# Patient Record
Sex: MALE | Race: WHITE | Employment: FULL TIME | URBAN - METROPOLITAN AREA
[De-identification: names, ages, dates, MRNs, and addresses within clinical notes are randomized per-mention and may not be internally consistent; named-entity substitution may affect disease eponyms.]

---

## 2017-04-21 RX ORDER — PAROXETINE HYDROCHLORIDE 40 MG/1
40 TABLET, FILM COATED ORAL EVERY MORNING
Qty: 30 TABLET | Refills: 0 | Status: SHIPPED | OUTPATIENT
Start: 2017-04-21 | End: 2017-05-02 | Stop reason: SDUPTHER

## 2017-05-02 ENCOUNTER — OFFICE VISIT (OUTPATIENT)
Dept: INTERNAL MEDICINE | Age: 27
End: 2017-05-02

## 2017-05-02 VITALS
BODY MASS INDEX: 20.68 KG/M2 | HEART RATE: 70 BPM | DIASTOLIC BLOOD PRESSURE: 72 MMHG | SYSTOLIC BLOOD PRESSURE: 90 MMHG | WEIGHT: 139.6 LBS | HEIGHT: 69 IN

## 2017-05-02 DIAGNOSIS — Z72.0 TOBACCO USE: ICD-10-CM

## 2017-05-02 DIAGNOSIS — F41.1 GAD (GENERALIZED ANXIETY DISORDER): Primary | ICD-10-CM

## 2017-05-02 PROCEDURE — 99213 OFFICE O/P EST LOW 20 MIN: CPT | Performed by: FAMILY MEDICINE

## 2017-05-02 RX ORDER — PAROXETINE HYDROCHLORIDE 40 MG/1
40 TABLET, FILM COATED ORAL EVERY MORNING
Qty: 30 TABLET | Refills: 11 | Status: SHIPPED | OUTPATIENT
Start: 2017-05-02 | End: 2018-07-02

## 2017-05-02 ASSESSMENT — ANXIETY QUESTIONNAIRES
3. WORRYING TOO MUCH ABOUT DIFFERENT THINGS: 1-SEVERAL DAYS
6. BECOMING EASILY ANNOYED OR IRRITABLE: 0-NOT AT ALL SURE
2. NOT BEING ABLE TO STOP OR CONTROL WORRYING: 0-NOT AT ALL SURE
4. TROUBLE RELAXING: 0-NOT AT ALL SURE
5. BEING SO RESTLESS THAT IT IS HARD TO SIT STILL: 0-NOT AT ALL SURE
GAD7 TOTAL SCORE: 1
7. FEELING AFRAID AS IF SOMETHING AWFUL MIGHT HAPPEN: 0-NOT AT ALL SURE
1. FEELING NERVOUS, ANXIOUS, OR ON EDGE: 0-NOT AT ALL SURE

## 2017-05-02 ASSESSMENT — PATIENT HEALTH QUESTIONNAIRE - PHQ9
SUM OF ALL RESPONSES TO PHQ QUESTIONS 1-9: 0
2. FEELING DOWN, DEPRESSED OR HOPELESS: 0
SUM OF ALL RESPONSES TO PHQ9 QUESTIONS 1 & 2: 0
1. LITTLE INTEREST OR PLEASURE IN DOING THINGS: 0

## 2017-05-03 RX ORDER — PAROXETINE HYDROCHLORIDE 20 MG/1
40 TABLET, FILM COATED ORAL DAILY
Qty: 180 TABLET | Refills: 4 | Status: SHIPPED | OUTPATIENT
Start: 2017-05-03 | End: 2018-05-13 | Stop reason: SDUPTHER

## 2017-12-19 ENCOUNTER — OFFICE VISIT (OUTPATIENT)
Dept: FAMILY MEDICINE CLINIC | Age: 27
End: 2017-12-19

## 2017-12-19 VITALS
HEIGHT: 67 IN | TEMPERATURE: 98 F | OXYGEN SATURATION: 99 % | RESPIRATION RATE: 16 BRPM | SYSTOLIC BLOOD PRESSURE: 128 MMHG | DIASTOLIC BLOOD PRESSURE: 70 MMHG | HEART RATE: 68 BPM | BODY MASS INDEX: 21.16 KG/M2 | WEIGHT: 134.8 LBS

## 2017-12-19 DIAGNOSIS — J01.10 ACUTE FRONTAL SINUSITIS, RECURRENCE NOT SPECIFIED: Primary | ICD-10-CM

## 2017-12-19 PROCEDURE — 99213 OFFICE O/P EST LOW 20 MIN: CPT | Performed by: NURSE PRACTITIONER

## 2017-12-19 PROCEDURE — G8427 DOCREV CUR MEDS BY ELIG CLIN: HCPCS | Performed by: NURSE PRACTITIONER

## 2017-12-19 PROCEDURE — G8484 FLU IMMUNIZE NO ADMIN: HCPCS | Performed by: NURSE PRACTITIONER

## 2017-12-19 PROCEDURE — G8420 CALC BMI NORM PARAMETERS: HCPCS | Performed by: NURSE PRACTITIONER

## 2017-12-19 PROCEDURE — 4004F PT TOBACCO SCREEN RCVD TLK: CPT | Performed by: NURSE PRACTITIONER

## 2017-12-19 RX ORDER — AMOXICILLIN AND CLAVULANATE POTASSIUM 875; 125 MG/1; MG/1
1 TABLET, FILM COATED ORAL 2 TIMES DAILY
Qty: 20 TABLET | Refills: 0 | Status: SHIPPED | OUTPATIENT
Start: 2017-12-19 | End: 2017-12-29

## 2017-12-19 ASSESSMENT — ENCOUNTER SYMPTOMS
VOMITING: 0
DIARRHEA: 0
TROUBLE SWALLOWING: 0
WHEEZING: 1
NAUSEA: 0
SHORTNESS OF BREATH: 0
ABDOMINAL PAIN: 0
SINUS PRESSURE: 1
HOARSE VOICE: 0
COUGH: 1
SWOLLEN GLANDS: 0
EYE DISCHARGE: 0
SORE THROAT: 0
EYE REDNESS: 0

## 2018-05-14 RX ORDER — PAROXETINE HYDROCHLORIDE 20 MG/1
TABLET, FILM COATED ORAL
Qty: 60 TABLET | Refills: 0 | Status: SHIPPED | OUTPATIENT
Start: 2018-05-14 | End: 2018-07-02 | Stop reason: SDUPTHER

## 2018-07-02 ENCOUNTER — OFFICE VISIT (OUTPATIENT)
Dept: INTERNAL MEDICINE | Age: 28
End: 2018-07-02
Payer: COMMERCIAL

## 2018-07-02 VITALS
BODY MASS INDEX: 22.66 KG/M2 | OXYGEN SATURATION: 98 % | WEIGHT: 144.4 LBS | SYSTOLIC BLOOD PRESSURE: 112 MMHG | HEIGHT: 67 IN | HEART RATE: 72 BPM | DIASTOLIC BLOOD PRESSURE: 82 MMHG

## 2018-07-02 DIAGNOSIS — Z23 NEED FOR PNEUMOCOCCAL VACCINATION: ICD-10-CM

## 2018-07-02 DIAGNOSIS — Z72.0 TOBACCO USE: ICD-10-CM

## 2018-07-02 DIAGNOSIS — F41.1 GAD (GENERALIZED ANXIETY DISORDER): Primary | ICD-10-CM

## 2018-07-02 PROCEDURE — 4004F PT TOBACCO SCREEN RCVD TLK: CPT | Performed by: FAMILY MEDICINE

## 2018-07-02 PROCEDURE — 90732 PPSV23 VACC 2 YRS+ SUBQ/IM: CPT | Performed by: FAMILY MEDICINE

## 2018-07-02 PROCEDURE — G8420 CALC BMI NORM PARAMETERS: HCPCS | Performed by: FAMILY MEDICINE

## 2018-07-02 PROCEDURE — 90471 IMMUNIZATION ADMIN: CPT | Performed by: FAMILY MEDICINE

## 2018-07-02 PROCEDURE — G8427 DOCREV CUR MEDS BY ELIG CLIN: HCPCS | Performed by: FAMILY MEDICINE

## 2018-07-02 PROCEDURE — 99214 OFFICE O/P EST MOD 30 MIN: CPT | Performed by: FAMILY MEDICINE

## 2018-07-02 RX ORDER — PAROXETINE HYDROCHLORIDE 20 MG/1
TABLET, FILM COATED ORAL
Qty: 180 TABLET | Refills: 3 | Status: SHIPPED | OUTPATIENT
Start: 2018-07-02 | End: 2019-08-23 | Stop reason: SDUPTHER

## 2018-07-02 ASSESSMENT — ENCOUNTER SYMPTOMS
SHORTNESS OF BREATH: 0
COUGH: 0
CHEST TIGHTNESS: 0
CHOKING: 0
APNEA: 0
STRIDOR: 0

## 2018-07-02 ASSESSMENT — PATIENT HEALTH QUESTIONNAIRE - PHQ9
2. FEELING DOWN, DEPRESSED OR HOPELESS: 0
1. LITTLE INTEREST OR PLEASURE IN DOING THINGS: 0
SUM OF ALL RESPONSES TO PHQ9 QUESTIONS 1 & 2: 0
SUM OF ALL RESPONSES TO PHQ QUESTIONS 1-9: 0

## 2018-09-19 ENCOUNTER — HOSPITAL ENCOUNTER (EMERGENCY)
Age: 28
Discharge: HOME | End: 2018-09-19
Attending: EMERGENCY MEDICINE
Payer: COMMERCIAL

## 2018-09-19 ENCOUNTER — APPOINTMENT (OUTPATIENT)
Dept: GENERAL RADIOLOGY | Age: 28
End: 2018-09-19
Payer: COMMERCIAL

## 2018-09-19 VITALS
BODY MASS INDEX: 22.5 KG/M2 | WEIGHT: 140 LBS | SYSTOLIC BLOOD PRESSURE: 126 MMHG | TEMPERATURE: 98.5 F | HEIGHT: 66 IN | HEART RATE: 78 BPM | OXYGEN SATURATION: 97 % | DIASTOLIC BLOOD PRESSURE: 58 MMHG | RESPIRATION RATE: 18 BRPM

## 2018-09-19 DIAGNOSIS — T14.8XXA PUNCTURE WOUND: ICD-10-CM

## 2018-09-19 DIAGNOSIS — S60.032A CONTUSION OF LEFT MIDDLE FINGER WITHOUT DAMAGE TO NAIL, INITIAL ENCOUNTER: Primary | ICD-10-CM

## 2018-09-19 PROCEDURE — 29130 APPL FINGER SPLINT STATIC: CPT

## 2018-09-19 PROCEDURE — 99282 EMERGENCY DEPT VISIT SF MDM: CPT

## 2018-09-19 PROCEDURE — 73140 X-RAY EXAM OF FINGER(S): CPT

## 2018-09-19 RX ORDER — CEPHALEXIN 500 MG/1
500 CAPSULE ORAL 4 TIMES DAILY
Qty: 28 CAPSULE | Refills: 0 | Status: SHIPPED | OUTPATIENT
Start: 2018-09-19 | End: 2018-09-26

## 2018-09-19 RX ORDER — GINSENG 100 MG
CAPSULE ORAL
Status: DISCONTINUED
Start: 2018-09-19 | End: 2018-09-19 | Stop reason: HOSPADM

## 2018-09-19 RX ORDER — HYDROCODONE BITARTRATE AND ACETAMINOPHEN 5; 325 MG/1; MG/1
1 TABLET ORAL EVERY 6 HOURS PRN
Qty: 10 TABLET | Refills: 0 | Status: SHIPPED | OUTPATIENT
Start: 2018-09-19 | End: 2018-09-26

## 2018-09-19 RX ORDER — IBUPROFEN 600 MG/1
600 TABLET ORAL EVERY 6 HOURS PRN
Qty: 30 TABLET | Refills: 0 | Status: SHIPPED | OUTPATIENT
Start: 2018-09-19

## 2018-09-19 ASSESSMENT — PAIN DESCRIPTION - PROGRESSION: CLINICAL_PROGRESSION: NOT CHANGED

## 2018-09-19 ASSESSMENT — PAIN DESCRIPTION - ONSET: ONSET: ON-GOING

## 2018-09-19 ASSESSMENT — PAIN DESCRIPTION - PAIN TYPE: TYPE: ACUTE PAIN

## 2018-09-19 ASSESSMENT — ENCOUNTER SYMPTOMS
SINUS PRESSURE: 0
BLOOD IN STOOL: 0
BACK PAIN: 0
TROUBLE SWALLOWING: 0
EYE REDNESS: 0
SORE THROAT: 0
CHEST TIGHTNESS: 0
CHOKING: 0
CONSTIPATION: 0
COUGH: 0
ABDOMINAL PAIN: 0
VOICE CHANGE: 0
WHEEZING: 0
FACIAL SWELLING: 0
SHORTNESS OF BREATH: 0
STRIDOR: 0
VOMITING: 0
DIARRHEA: 0
EYE PAIN: 0
EYE DISCHARGE: 0

## 2018-09-19 ASSESSMENT — PAIN DESCRIPTION - DESCRIPTORS: DESCRIPTORS: NUMBNESS

## 2018-09-19 ASSESSMENT — PAIN SCALES - GENERAL: PAINLEVEL_OUTOF10: 4

## 2018-09-19 ASSESSMENT — PAIN DESCRIPTION - ORIENTATION: ORIENTATION: MID

## 2018-09-19 ASSESSMENT — PAIN DESCRIPTION - FREQUENCY: FREQUENCY: CONTINUOUS

## 2018-09-19 ASSESSMENT — PAIN DESCRIPTION - LOCATION: LOCATION: FINGER (COMMENT WHICH ONE)

## 2018-09-19 NOTE — ED PROVIDER NOTES
[facility] ED  eMERGENCY dEPARTMENT eNCOUnter      Pt Name: Shahla Sanders  MRN: [id number]  [place] 1990  Date of evaluation: 9/19/2018  Provider: Yasir Lindsey MD    [address] [place] [address]       Chief Complaint   Patient presents with    Puncture Wound     Puncture wound to left 3rd finger from a drill bit 30 min. ago. HISTORY OF PRESENT ILLNESS   (Location/Symptom, Timing/Onset, Context/Setting, Quality, Duration, Modifying Factors, Severity)  Note limiting factors. Shahla Sanders is a 33 y.o. male who presents to the emergency department Patient a right-hand-dominant history of anxiety up to date on tetanus immunization as per patient accidentally he has a puncture wound to the left middle finger with some swelling bleeding patient was drilling and it went inside patient has no numbness tingling to the tip of the finger no other injuries     HPI    Nursing Notes were reviewed. REVIEW OF SYSTEMS    (2-9 systems for level 4, 10 or more for level 5)     Review of Systems   Constitutional: Negative. Negative for activity change and fever. HENT: Negative for congestion, drooling, facial swelling, mouth sores, nosebleeds, sinus pressure, sore throat, trouble swallowing and voice change. Eyes: Negative for pain, discharge, redness and visual disturbance. Respiratory: Negative for cough, choking, chest tightness, shortness of breath, wheezing and stridor. Cardiovascular: Negative for chest pain, palpitations and leg swelling. Gastrointestinal: Negative for abdominal pain, blood in stool, constipation, diarrhea and vomiting. Endocrine: Negative for cold intolerance, polyphagia and polyuria. Genitourinary: Negative for dysuria, flank pain, frequency, genital sores and urgency. Musculoskeletal: Positive for arthralgias and joint swelling. Negative for back pain, neck pain and neck stiffness. Skin: Positive for wound. Negative for pallor and rash.    Neurological: Negative for tremors, seizures, syncope, weakness, numbness and headaches. Hematological: Negative for adenopathy. Does not bruise/bleed easily. Psychiatric/Behavioral: Negative for agitation, behavioral problems, hallucinations and sleep disturbance. The patient is not hyperactive. All other systems reviewed and are negative. Except as noted above the remainder of the review of systems was reviewed and negative. PAST MEDICAL HISTORY     Past Medical History:   Diagnosis Date    Anxiety     Depression     Tobacco use 5/2/2017         SURGICAL HISTORY     History reviewed. No pertinent surgical history. CURRENT MEDICATIONS       Previous Medications    ALBUTEROL SULFATE  (90 BASE) MCG/ACT INHALER    Inhale 2 puffs into the lungs every 6 hours as needed for Wheezing    PAROXETINE (PAXIL) 20 MG TABLET    TAKE TWO TABLETS BY MOUTH ONCE DAILY       ALLERGIES     Patient has no known allergies.     FAMILY HISTORY       Family History   Problem Relation Age of Onset    Diabetes Maternal Grandmother     Cancer Maternal Grandfather           SOCIAL HISTORY       Social History     Social History    Marital status: Single     Spouse name: N/A    Number of children: N/A    Years of education: N/A     Occupational History           Social History Main Topics    Smoking status: Current Every Day Smoker     Packs/day: 1.00     Start date: 2008    Smokeless tobacco: Former User      Comment: Would like to quit smoking    Alcohol use No    Drug use: No    Sexual activity: Not Currently     Partners: Female     Other Topics Concern    None     Social History Narrative    None       SCREENINGS             PHYSICAL EXAM    (up to 7 for level 4, 8 or more for level 5)     ED Triage Vitals [09/19/18 1839]   BP Temp Temp src Pulse Resp SpO2 Height Weight   (!) 126/58 98.5 °F (36.9 °C) -- 78 18 97 % 5' 6\" (1.676 m) 140 lb (63.5 kg)       Physical Exam   Constitutional: He is oriented to person, place, and time. He appears well-developed and well-nourished. HENT:   Head: Normocephalic and atraumatic. Mouth/Throat: No oropharyngeal exudate. Eyes: EOM are normal. Right eye exhibits no discharge. Left eye exhibits no discharge. Neck: Neck supple. No JVD present. No tracheal deviation present. No thyromegaly present. Cardiovascular: Normal rate, regular rhythm and normal heart sounds. Exam reveals no gallop and no friction rub. No murmur heard. Pulmonary/Chest: Breath sounds normal. No respiratory distress. He has no wheezes. He has no rales. He exhibits no tenderness. Abdominal: Soft. Bowel sounds are normal. He exhibits no mass. There is no rebound. Musculoskeletal: Normal range of motion. He exhibits tenderness. He exhibits no edema or deformity. Attention given to the left middle finger patient has a puncture wound to the middle phalanx on the volar aspect patient no contamination seen patient able to flex and extend of her DIP joint   Lymphadenopathy:     He has no cervical adenopathy. Neurological: He is alert and oriented to person, place, and time. No cranial nerve deficit. He exhibits normal muscle tone. Skin: Skin is warm. No rash noted. No erythema.    Psychiatric: His behavior is normal. Thought content normal.       DIAGNOSTIC RESULTS     EKG: All EKG's are interpreted by the Emergency Department Physician who either signs or Co-signs this chart in the absence of a cardiologist.        RADIOLOGY:   Non-plain film images such as CT, Ultrasound and MRI are read by the radiologist. Plain radiographic images are visualized and preliminarily interpreted by the emergency physician with the below findings:        Interpretation per the Radiologist below, if available at the time of this note:    XR FINGER LEFT (MIN 2 VIEWS)    (Results Pending)         ED BEDSIDE ULTRASOUND:   Performed by ED Physician - none    LABS:  Labs Reviewed - No data to display    All other labs were within normal range or not returned as of this dictation. EMERGENCY DEPARTMENT COURSE and DIFFERENTIAL DIAGNOSIS/MDM:   Vitals:    Vitals:    09/19/18 1839   BP: (!) 126/58   Pulse: 78   Resp: 18   Temp: 98.5 °F (36.9 °C)   SpO2: 97%   Weight: 140 lb (63.5 kg)   Height: 5' 6\" (1.676 m)           MDM  Number of Diagnoses or Management Options  Contusion of left middle finger without damage to nail, initial encounter: established and improving  Puncture wound: established and improving  Diagnosis management comments: Wound cleaned and soaked and also was flushed there is some debris visible on the x-ray but nothing visible clinically       Amount and/or Complexity of Data Reviewed  Tests in the radiology section of CPT®: ordered and reviewed        CRITICAL CARE TIME   Total Critical Care time was  minutes, excluding separately reportable procedures. There was a high probability of clinically significant/life threatening deterioration in the patient's condition which required my urgent intervention. CONSULTS:  None    PROCEDURES:  Unless otherwise noted below, none     Procedures    FINAL IMPRESSION      1. Contusion of left middle finger without damage to nail, initial encounter    2. Puncture wound          DISPOSITION/PLAN   DISPOSITION Decision To Discharge 09/19/2018 07:37:21 PM      PATIENT REFERRED TO:  ADELSO Moreno  [address] [place] [address]  [phone]    In 1 week        DISCHARGE MEDICATIONS:  New Prescriptions    CEPHALEXIN (KEFLEX) 500 MG CAPSULE    Take 1 capsule by mouth 4 times daily for 7 days    HYDROCODONE-ACETAMINOPHEN (NORCO) 5-325 MG PER TABLET    Take 1 tablet by mouth every 6 hours as needed for Pain for up to 7 days. .    IBUPROFEN (ADVIL;MOTRIN) 600 MG TABLET    Take 1 tablet by mouth every 6 hours as needed for Pain          (Please note that portions of this note were completed with a voice recognition program.  Efforts were made to edit the dictations but

## 2019-08-28 ENCOUNTER — OFFICE VISIT (OUTPATIENT)
Dept: INTERNAL MEDICINE | Age: 29
End: 2019-08-28
Payer: COMMERCIAL

## 2019-08-28 VITALS
HEART RATE: 59 BPM | DIASTOLIC BLOOD PRESSURE: 60 MMHG | OXYGEN SATURATION: 99 % | SYSTOLIC BLOOD PRESSURE: 100 MMHG | BODY MASS INDEX: 22.05 KG/M2 | HEIGHT: 66 IN | WEIGHT: 137.2 LBS

## 2019-08-28 DIAGNOSIS — Z00.00 ANNUAL PHYSICAL EXAM: Primary | ICD-10-CM

## 2019-08-28 DIAGNOSIS — F41.1 GAD (GENERALIZED ANXIETY DISORDER): ICD-10-CM

## 2019-08-28 DIAGNOSIS — Z72.0 TOBACCO USE: ICD-10-CM

## 2019-08-28 PROCEDURE — 99395 PREV VISIT EST AGE 18-39: CPT | Performed by: FAMILY MEDICINE

## 2019-08-28 RX ORDER — PAROXETINE HYDROCHLORIDE 20 MG/1
TABLET, FILM COATED ORAL
Qty: 180 TABLET | Refills: 3 | Status: SHIPPED | OUTPATIENT
Start: 2019-08-28 | End: 2020-12-16 | Stop reason: SDUPTHER

## 2019-08-28 ASSESSMENT — ENCOUNTER SYMPTOMS
COUGH: 0
CHEST TIGHTNESS: 0
SHORTNESS OF BREATH: 0
CHOKING: 0
APNEA: 0
STRIDOR: 0

## 2019-08-28 ASSESSMENT — PATIENT HEALTH QUESTIONNAIRE - PHQ9
SUM OF ALL RESPONSES TO PHQ9 QUESTIONS 1 & 2: 2
2. FEELING DOWN, DEPRESSED OR HOPELESS: 1
SUM OF ALL RESPONSES TO PHQ QUESTIONS 1-9: 2
SUM OF ALL RESPONSES TO PHQ QUESTIONS 1-9: 2
1. LITTLE INTEREST OR PLEASURE IN DOING THINGS: 1

## 2019-08-28 NOTE — PROGRESS NOTES
Patient: Aminata Lopez    YOB: 1990      Patient Active Problem List    Diagnosis Date Noted    Tobacco use 05/02/2017    KINSEY (generalized anxiety disorder) 05/02/2017    Regular astigmatism 10/06/2014     Past Medical History:   Diagnosis Date    Anxiety     Depression     Tobacco use 5/2/2017     History reviewed. No pertinent surgical history.   Family History   Problem Relation Age of Onset    Diabetes Maternal Grandmother     Cancer Maternal Grandfather      Social History     Socioeconomic History    Marital status: Single     Spouse name: Not on file    Number of children: Not on file    Years of education: Not on file    Highest education level: Not on file   Occupational History    Occupation: [employer]    Social Needs    Financial resource strain: Not on file    Food insecurity:     Worry: Not on file     Inability: Not on file    Transportation needs:     Medical: Not on file     Non-medical: Not on file   Tobacco Use    Smoking status: Current Every Day Smoker     Packs/day: 1.00     Start date: 2008    Smokeless tobacco: Former User    Tobacco comment: Would like to quit smoking   Substance and Sexual Activity    Alcohol use: No     Alcohol/week: 0.0 standard drinks    Drug use: No    Sexual activity: Not Currently     Partners: Female   Lifestyle    Physical activity:     Days per week: Not on file     Minutes per session: Not on file    Stress: Not on file   Relationships    Social connections:     Talks on phone: Not on file     Gets together: Not on file     Attends Congregation service: Not on file     Active member of club or organization: Not on file     Attends meetings of clubs or organizations: Not on file     Relationship status: Not on file    Intimate partner violence:     Fear of current or ex partner: Not on file     Emotionally abused: Not on file     Physically abused: Not on file     Forced sexual activity: Not on file   Other Topics Concern    Not

## 2019-09-24 ENCOUNTER — OFFICE VISIT (OUTPATIENT)
Dept: INTERNAL MEDICINE | Age: 29
End: 2019-09-24
Payer: COMMERCIAL

## 2019-09-24 ENCOUNTER — NURSE TRIAGE (OUTPATIENT)
Dept: OTHER | Facility: CLINIC | Age: 29
End: 2019-09-24

## 2019-09-24 VITALS
HEART RATE: 66 BPM | OXYGEN SATURATION: 96 % | DIASTOLIC BLOOD PRESSURE: 74 MMHG | WEIGHT: 138.2 LBS | SYSTOLIC BLOOD PRESSURE: 122 MMHG | BODY MASS INDEX: 22.21 KG/M2 | HEIGHT: 66 IN | TEMPERATURE: 98.6 F

## 2019-09-24 DIAGNOSIS — R19.7 DIARRHEA, UNSPECIFIED TYPE: Primary | ICD-10-CM

## 2019-09-24 PROCEDURE — 99213 OFFICE O/P EST LOW 20 MIN: CPT | Performed by: PHYSICIAN ASSISTANT

## 2019-09-24 PROCEDURE — G8427 DOCREV CUR MEDS BY ELIG CLIN: HCPCS | Performed by: PHYSICIAN ASSISTANT

## 2019-09-24 PROCEDURE — 4004F PT TOBACCO SCREEN RCVD TLK: CPT | Performed by: PHYSICIAN ASSISTANT

## 2019-09-24 PROCEDURE — G8420 CALC BMI NORM PARAMETERS: HCPCS | Performed by: PHYSICIAN ASSISTANT

## 2019-09-24 ASSESSMENT — ENCOUNTER SYMPTOMS
BLOATING: 0
DIARRHEA: 1
VOMITING: 0
FLATUS: 0
ABDOMINAL PAIN: 0

## 2019-09-25 DIAGNOSIS — R19.7 DIARRHEA, UNSPECIFIED TYPE: ICD-10-CM

## 2019-09-26 LAB
C DIFF TOXIN/ANTIGEN: NORMAL
GI BACTERIAL PATHOGENS BY PCR: NORMAL

## 2019-09-30 DIAGNOSIS — R19.7 DIARRHEA, UNSPECIFIED TYPE: Primary | ICD-10-CM

## 2019-10-01 ASSESSMENT — ENCOUNTER SYMPTOMS
NAUSEA: 0
BLOOD IN STOOL: 0
RECTAL PAIN: 0
CONSTIPATION: 0
SHORTNESS OF BREATH: 0
COUGH: 0

## 2019-10-15 ENCOUNTER — OFFICE VISIT (OUTPATIENT)
Dept: GASTROENTEROLOGY | Age: 29
End: 2019-10-15
Payer: COMMERCIAL

## 2019-10-15 VITALS
HEART RATE: 98 BPM | OXYGEN SATURATION: 98 % | BODY MASS INDEX: 20.89 KG/M2 | HEIGHT: 66 IN | DIASTOLIC BLOOD PRESSURE: 80 MMHG | SYSTOLIC BLOOD PRESSURE: 136 MMHG | WEIGHT: 130 LBS

## 2019-10-15 DIAGNOSIS — R19.7 DIARRHEA, UNSPECIFIED TYPE: ICD-10-CM

## 2019-10-15 DIAGNOSIS — R19.7 DIARRHEA, UNSPECIFIED TYPE: Primary | ICD-10-CM

## 2019-10-15 LAB
ALBUMIN SERPL-MCNC: 5 G/DL (ref 3.5–4.6)
ALP BLD-CCNC: 61 U/L (ref 35–104)
ALT SERPL-CCNC: 22 U/L (ref 0–41)
ANION GAP SERPL CALCULATED.3IONS-SCNC: 12 MEQ/L (ref 9–15)
AST SERPL-CCNC: 24 U/L (ref 0–40)
BILIRUB SERPL-MCNC: 0.3 MG/DL (ref 0.2–0.7)
BUN BLDV-MCNC: 8 MG/DL (ref 6–20)
CALCIUM SERPL-MCNC: 9.4 MG/DL (ref 8.5–9.9)
CHLORIDE BLD-SCNC: 102 MEQ/L (ref 95–107)
CO2: 28 MEQ/L (ref 20–31)
CREAT SERPL-MCNC: 0.89 MG/DL (ref 0.7–1.2)
GFR AFRICAN AMERICAN: >60
GFR NON-AFRICAN AMERICAN: >60
GLOBULIN: 1.9 G/DL (ref 2.3–3.5)
GLUCOSE BLD-MCNC: 75 MG/DL (ref 70–99)
HCT VFR BLD CALC: 44.9 % (ref 42–52)
HEMOGLOBIN: 15.5 G/DL (ref 14–18)
MCH RBC QN AUTO: 31.6 PG (ref 27–31.3)
MCHC RBC AUTO-ENTMCNC: 34.5 % (ref 33–37)
MCV RBC AUTO: 91.7 FL (ref 80–100)
PDW BLD-RTO: 14 % (ref 11.5–14.5)
PLATELET # BLD: 158 K/UL (ref 130–400)
POTASSIUM SERPL-SCNC: 4 MEQ/L (ref 3.4–4.9)
RBC # BLD: 4.9 M/UL (ref 4.7–6.1)
SODIUM BLD-SCNC: 142 MEQ/L (ref 135–144)
TOTAL PROTEIN: 6.9 G/DL (ref 6.3–8)
WBC # BLD: 7.2 K/UL (ref 4.8–10.8)

## 2019-10-15 PROCEDURE — 99204 OFFICE O/P NEW MOD 45 MIN: CPT | Performed by: INTERNAL MEDICINE

## 2019-10-15 RX ORDER — POLYETHYLENE GLYCOL 3350, SODIUM CHLORIDE, SODIUM BICARBONATE, POTASSIUM CHLORIDE 420; 11.2; 5.72; 1.48 G/4L; G/4L; G/4L; G/4L
4000 POWDER, FOR SOLUTION ORAL ONCE
Qty: 1 BOTTLE | Refills: 0 | Status: SHIPPED | OUTPATIENT
Start: 2019-10-15 | End: 2019-10-15

## 2019-10-15 ASSESSMENT — ENCOUNTER SYMPTOMS
NAUSEA: 0
BLOOD IN STOOL: 0
CHEST TIGHTNESS: 0
ABDOMINAL PAIN: 1
SHORTNESS OF BREATH: 0
EYE REDNESS: 0
EYE PAIN: 0
PHOTOPHOBIA: 0
TROUBLE SWALLOWING: 0
ABDOMINAL DISTENTION: 0
RECTAL PAIN: 0
COLOR CHANGE: 0
CONSTIPATION: 0
VOICE CHANGE: 0
WHEEZING: 0
DIARRHEA: 1
VOMITING: 0

## 2019-10-18 LAB — CELIAC PANEL: 5 UNITS (ref 0–19)

## 2019-10-31 ENCOUNTER — OFFICE VISIT (OUTPATIENT)
Dept: INTERNAL MEDICINE | Age: 29
End: 2019-10-31
Payer: COMMERCIAL

## 2019-10-31 VITALS
OXYGEN SATURATION: 98 % | BODY MASS INDEX: 21.6 KG/M2 | TEMPERATURE: 98.7 F | SYSTOLIC BLOOD PRESSURE: 110 MMHG | WEIGHT: 134.4 LBS | HEART RATE: 67 BPM | DIASTOLIC BLOOD PRESSURE: 70 MMHG | HEIGHT: 66 IN

## 2019-10-31 DIAGNOSIS — J06.9 ACUTE URI: Primary | ICD-10-CM

## 2019-10-31 DIAGNOSIS — R53.83 FATIGUE, UNSPECIFIED TYPE: ICD-10-CM

## 2019-10-31 LAB
INFLUENZA A ANTIBODY: NORMAL
INFLUENZA B ANTIBODY: NORMAL

## 2019-10-31 PROCEDURE — 99213 OFFICE O/P EST LOW 20 MIN: CPT | Performed by: PHYSICIAN ASSISTANT

## 2019-10-31 PROCEDURE — 87804 INFLUENZA ASSAY W/OPTIC: CPT | Performed by: PHYSICIAN ASSISTANT

## 2019-10-31 PROCEDURE — 4004F PT TOBACCO SCREEN RCVD TLK: CPT | Performed by: PHYSICIAN ASSISTANT

## 2019-10-31 PROCEDURE — G8420 CALC BMI NORM PARAMETERS: HCPCS | Performed by: PHYSICIAN ASSISTANT

## 2019-10-31 PROCEDURE — G8427 DOCREV CUR MEDS BY ELIG CLIN: HCPCS | Performed by: PHYSICIAN ASSISTANT

## 2019-10-31 PROCEDURE — G8484 FLU IMMUNIZE NO ADMIN: HCPCS | Performed by: PHYSICIAN ASSISTANT

## 2019-10-31 RX ORDER — AZITHROMYCIN 250 MG/1
250 TABLET, FILM COATED ORAL SEE ADMIN INSTRUCTIONS
Qty: 6 TABLET | Refills: 0 | Status: SHIPPED | OUTPATIENT
Start: 2019-10-31 | End: 2019-11-05

## 2019-10-31 ASSESSMENT — ENCOUNTER SYMPTOMS
SORE THROAT: 0
SWOLLEN GLANDS: 0
WHEEZING: 0
VOMITING: 0
NAUSEA: 0
RHINORRHEA: 1
COUGH: 1
SHORTNESS OF BREATH: 0

## 2019-11-13 ENCOUNTER — TELEPHONE (OUTPATIENT)
Dept: GASTROENTEROLOGY | Age: 29
End: 2019-11-13

## 2019-11-14 RX ORDER — POLYETHYLENE GLYCOL 3350, SODIUM CHLORIDE, SODIUM BICARBONATE, POTASSIUM CHLORIDE 420; 11.2; 5.72; 1.48 G/4L; G/4L; G/4L; G/4L
4000 POWDER, FOR SOLUTION ORAL ONCE
Qty: 1 BOTTLE | Refills: 0 | Status: SHIPPED | OUTPATIENT
Start: 2019-11-14 | End: 2019-11-14

## 2020-12-16 ENCOUNTER — VIRTUAL VISIT (OUTPATIENT)
Dept: INTERNAL MEDICINE | Age: 30
End: 2020-12-16

## 2020-12-16 PROCEDURE — 99213 OFFICE O/P EST LOW 20 MIN: CPT | Performed by: FAMILY MEDICINE

## 2020-12-16 RX ORDER — PAROXETINE 30 MG/1
TABLET, FILM COATED ORAL
Qty: 90 TABLET | Refills: 3 | Status: SHIPPED | OUTPATIENT
Start: 2020-12-16 | End: 2021-12-22 | Stop reason: SDUPTHER

## 2020-12-16 NOTE — PROGRESS NOTES
Patient: Isabella Paul    YOB: 1990    Date: 12/16/20       Patient Active Problem List    Diagnosis Date Noted    Tobacco use 05/02/2017    KINSEY (generalized anxiety disorder) 05/02/2017    Regular astigmatism 10/06/2014     Past Medical History:   Diagnosis Date    Anxiety     Depression     Tobacco use 5/2/2017     No past surgical history on file.   Family History   Problem Relation Age of Onset    Diabetes Maternal Grandmother     Cancer Maternal Grandfather     Colon Cancer Neg Hx      Social History     Socioeconomic History    Marital status: Single     Spouse name: Not on file    Number of children: Not on file    Years of education: Not on file    Highest education level: Not on file   Occupational History    Occupation:     Social Needs    Financial resource strain: Not on file    Food insecurity     Worry: Not on file     Inability: Not on file   [employer] needs     Medical: Not on file     Non-medical: Not on file   Tobacco Use    Smoking status: Current Every Day Smoker     Packs/day: 1.00     Start date: 2008    Smokeless tobacco: Former User    Tobacco comment: Would like to quit smoking   Substance and Sexual Activity    Alcohol use: No     Alcohol/week: 0.0 standard drinks    Drug use: No    Sexual activity: Not Currently     Partners: Female   Lifestyle    Physical activity     Days per week: Not on file     Minutes per session: Not on file    Stress: Not on file   Relationships    Social connections     Talks on phone: Not on file     Gets together: Not on file     Attends Jew service: Not on file     Active member of club or organization: Not on file     Attends meetings of clubs or organizations: Not on file     Relationship status: Not on file    Intimate partner violence     Fear of current or ex partner: Not on file     Emotionally abused: Not on file     Physically abused: Not on file     Forced sexual activity: Not on file Other Topics Concern    Not on file   Social History Narrative    Not on file     Current Outpatient Medications on File Prior to Visit   Medication Sig Dispense Refill    PARoxetine (PAXIL) 20 MG tablet TAKE 2 TABLETS BY MOUTH ONCE DAILY 180 tablet 3    ibuprofen (ADVIL;MOTRIN) 600 MG tablet Take 1 tablet by mouth every 6 hours as needed for Pain 30 tablet 0    albuterol sulfate  (90 BASE) MCG/ACT inhaler Inhale 2 puffs into the lungs every 6 hours as needed for Wheezing       No current facility-administered medications on file prior to visit. No Known Allergies    No chief complaint on file. TELEHEALTH EVALUATION -- Audio/Visual (During [facility]- public health emergency)    Due to COVID 19 outbreak, patient's office visit was converted to a virtual visit. Patient was contacted and agreed to proceed with a virtual visit via [email or web address]  The risks and benefits of converting to a virtual visit were discussed in light of the current infectious disease epidemic. Patient also understood that insurance coverage and co-pays are up to their individual insurance plans. Pursuant to the emergency declaration under the [facility], [facility] waiver authority and the [employer] and [employer] Act, this Virtual  Visit was conducted, with patient's consent, to reduce the patient's risk of exposure to COVID-19 and provide continuity of care for an established patient. Services were provided through a video discussion virtually to substitute for in-person clinic visit. HPI    F/u on KINSEY  Pt has been doing well on medication  Mild med SE\"s with loose stools that come and go once on higher dose  No SI/HI      Review of Systems   Constitutional: Negative for activity change, appetite change, chills, diaphoresis, fatigue, fever and unexpected weight change. Respiratory: Negative for cough, choking, chest tightness, shortness of breath, wheezing and stridor. Cardiovascular: Negative for chest pain, palpitations and leg swelling. Physical Exam    Nursing note reviewed. Constitutional:       General: no acute distress. Appearance: Normal appearance. normal weight. not ill-appearing, toxic-appearing or diaphoretic. HENT:      Head: Normocephalic and atraumatic. Right Ear: External ear normal.      Left Ear: External ear normal.      Nose: Nose normal.   Eyes:      General: No scleral icterus. Right eye: No discharge. Left eye: No discharge. Extraocular Movements: Extraocular movements intact. Conjunctiva/sclera: Conjunctivae normal.   Neck:      Musculoskeletal: Normal range of motion. Pulmonary:      Effort: Pulmonary effort is normal.   Musculoskeletal: Normal range of motion. Neurological:      General: No focal deficit present. Mental Status: alert. Mental status is at baseline. Psychiatric:         Attention and Perception: Attention and perception normal.         Mood and Affect: Mood and affect normal.         Speech: Speech normal.         Behavior: Behavior normal. Behavior is cooperative. Thought Content: Thought content normal.         Cognition and Memory: Memory normal.     Due to this being a TeleHealth encounter, evaluation of the following organ systems is limited: Vitals/Constitutional/EENT/Resp/CV/GI//MS/Neuro/Skin/Heme-Lymph-Imm. Assessment:  Stefani Tesfaye was seen today for anxiety.     Diagnoses and all orders for this visit:    KINSEY (generalized anxiety disorder)  -     PARoxetine (PAXIL) 30 MG tablet; TAKE 1 TABLETS BY MOUTH ONCE DAILY  Reduce dose back to 30 mg  Stable, controlled  Plan: continue current medications      Return in about 1 year (around 12/16/2021) for KINSEY/Depression, Yearly Exam.

## 2020-12-18 ASSESSMENT — ENCOUNTER SYMPTOMS
STRIDOR: 0
SHORTNESS OF BREATH: 0
CHEST TIGHTNESS: 0
WHEEZING: 0
COUGH: 0
CHOKING: 0

## 2021-02-01 ENCOUNTER — VIRTUAL VISIT (OUTPATIENT)
Dept: INTERNAL MEDICINE | Age: 31
End: 2021-02-01
Payer: COMMERCIAL

## 2021-02-01 DIAGNOSIS — H65.93 MIDDLE EAR EFFUSION, BILATERAL: Primary | ICD-10-CM

## 2021-02-01 DIAGNOSIS — J34.89 RHINORRHEA: ICD-10-CM

## 2021-02-01 DIAGNOSIS — J02.9 SORE THROAT: ICD-10-CM

## 2021-02-01 LAB — S PYO AG THROAT QL: NORMAL

## 2021-02-01 PROCEDURE — G8427 DOCREV CUR MEDS BY ELIG CLIN: HCPCS | Performed by: NURSE PRACTITIONER

## 2021-02-01 PROCEDURE — 87880 STREP A ASSAY W/OPTIC: CPT | Performed by: NURSE PRACTITIONER

## 2021-02-01 PROCEDURE — 99213 OFFICE O/P EST LOW 20 MIN: CPT | Performed by: NURSE PRACTITIONER

## 2021-02-01 RX ORDER — LORATADINE 10 MG/1
10 TABLET ORAL DAILY
Qty: 30 TABLET | Refills: 0 | Status: SHIPPED | OUTPATIENT
Start: 2021-02-01

## 2021-02-01 RX ORDER — FLUTICASONE PROPIONATE 50 MCG
1 SPRAY, SUSPENSION (ML) NASAL DAILY
Qty: 1 BOTTLE | Refills: 0 | Status: SHIPPED | OUTPATIENT
Start: 2021-02-01

## 2021-02-01 SDOH — ECONOMIC STABILITY: TRANSPORTATION INSECURITY
IN THE PAST 12 MONTHS, HAS LACK OF TRANSPORTATION KEPT YOU FROM MEETINGS, WORK, OR FROM GETTING THINGS NEEDED FOR DAILY LIVING?: NOT ASKED

## 2021-02-01 SDOH — ECONOMIC STABILITY: INCOME INSECURITY: HOW HARD IS IT FOR YOU TO PAY FOR THE VERY BASICS LIKE FOOD, HOUSING, MEDICAL CARE, AND HEATING?: NOT HARD AT ALL

## 2021-02-01 SDOH — ECONOMIC STABILITY: TRANSPORTATION INSECURITY
IN THE PAST 12 MONTHS, HAS THE LACK OF TRANSPORTATION KEPT YOU FROM MEDICAL APPOINTMENTS OR FROM GETTING MEDICATIONS?: NOT ASKED

## 2021-02-01 SDOH — ECONOMIC STABILITY: FOOD INSECURITY: WITHIN THE PAST 12 MONTHS, YOU WORRIED THAT YOUR FOOD WOULD RUN OUT BEFORE YOU GOT MONEY TO BUY MORE.: NEVER TRUE

## 2021-02-01 ASSESSMENT — ENCOUNTER SYMPTOMS
TROUBLE SWALLOWING: 0
WHEEZING: 0
RHINORRHEA: 1
COUGH: 1
FACIAL SWELLING: 0
EYE DISCHARGE: 0
EYE ITCHING: 0
SORE THROAT: 1
SINUS PRESSURE: 0
CHEST TIGHTNESS: 0
SHORTNESS OF BREATH: 0
EYE REDNESS: 0

## 2021-02-01 ASSESSMENT — PATIENT HEALTH QUESTIONNAIRE - PHQ9
SUM OF ALL RESPONSES TO PHQ QUESTIONS 1-9: 0
SUM OF ALL RESPONSES TO PHQ9 QUESTIONS 1 & 2: 0
2. FEELING DOWN, DEPRESSED OR HOPELESS: 0
SUM OF ALL RESPONSES TO PHQ QUESTIONS 1-9: 0

## 2021-02-01 NOTE — PROGRESS NOTES
Power Mccullough (:  1990) is a 35 y.o. male, Established patient, here for evaluation of the following chief complaint(s):  Pharyngitis (started friday, congestion)      Patient-Reported Vitals 2021   Patient-Reported Weight 130lb   Patient-Reported Height 5ft 6in         ASSESSMENT/PLAN:  1. Middle ear effusion, bilateral  -     fluticasone (FLONASE) 50 MCG/ACT nasal spray; 1 spray by Nasal route daily, Disp-1 Bottle, R-0Normal  2. Sore throat  -     POCT rapid strep A  -     Culture, Throat; Future  3. Rhinorrhea  -     loratadine (CLARITIN) 10 MG tablet; Take 1 tablet by mouth daily, Disp-30 tablet, R-0Normal      Return if symptoms worsen or fail to improve. SUBJECTIVE/OBJECTIVE:    HPI     +Runny nose that started 21, Friday  Worse over the weekend  +Cough/clearing throat all weekend  +Runny nose continues  +Bilateral ear pressure  +Achy upper neck, just below the ears  +sore throat over the weekend  Sore throat worse in the am, improves during day  No fever, no chills  No shortness of breath  No headaches  Denies loss of taste or smell  Denies stuffy nose  No known exposure to Covid+ person  Admits to usually staying at home  +smoker  Has not taken any OTC medications      Review of Systems   Constitutional: Negative for chills, fatigue and fever. HENT: Positive for ear pain (\"pressure\"), rhinorrhea and sore throat. Negative for congestion, facial swelling, sinus pressure and trouble swallowing. Eyes: Negative for discharge, redness and itching. Respiratory: Positive for cough. Negative for chest tightness, shortness of breath and wheezing. Cardiovascular: Negative for chest pain and palpitations. Musculoskeletal: Positive for neck pain (achy, just below bilateral ears). Negative for arthralgias and myalgias. Neurological: Negative for dizziness, light-headedness and headaches.          Physical Exam  PHYSICAL EXAMINATION: [ INSTRUCTIONS:  \"[x]\" Indicates a positive item  \"[]\" Indicates a negative item  -- DELETE ALL ITEMS NOT EXAMINED]    [x] Alert  [x] Oriented to person/place/time      [x] No apparent distress      [x] Breathing appears normal      [x] Normal Mood      [] Poor short term memory  [] Poor long term memory    [] OTHER:      Due to this being a TeleHealth encounter, evaluation of the following organ systems is limited: Vitals/Constitutional/EENT/Resp/CV/GI//MS/Neuro/Skin/Heme-Lymph-Imm. On this date 02/01/21 I have spent 11 minutes reviewing previous notes, test results and face to face (virtual) with the patient discussing the diagnosis and importance of compliance with the treatment plan as well as documenting on the day of the visit. Marie Taylor is a 35 y.o. male being evaluated by a Virtual Visit (video visit) encounter to address concerns as mentioned above. A caregiver was present when appropriate. Due to this being a TeleHealth encounter ([facility]), evaluation of the following organ systems was limited: Vitals/Constitutional/EENT/Resp/CV/GI//MS/Neuro/Skin/Heme-Lymph-Imm. Pursuant to the emergency declaration under the [address] [place] [address] authority and the [employer] and [employer] Act, this Virtual Visit was conducted with patient's (and/or legal guardian's) consent, to reduce the patient's risk of exposure to COVID-19 and provide necessary medical care. The patient (and/or legal guardian) has also been advised to contact this office for worsening conditions or problems, and seek emergency medical treatment and/or call 911 if deemed necessary.      Patient identification was verified at the start of the visit: Yes Services were provided through a video synchronous discussion virtually to substitute for in-person clinic visit. Patient was located at home and provider was located in office or at home. An electronic signature was used to authenticate this note.     --MARIANA Bailon

## 2021-02-02 DIAGNOSIS — J02.9 SORE THROAT: ICD-10-CM

## 2021-02-03 ENCOUNTER — TELEPHONE (OUTPATIENT)
Dept: INTERNAL MEDICINE | Age: 31
End: 2021-02-03

## 2021-02-03 NOTE — TELEPHONE ENCOUNTER
Patient says he did a VV with Pasquale Tessa on Monday and he was to call back if he did not start feeling better. He wants Wanadenike Chew to know he has started feeling worse. What would you like him to do? Thank you.

## 2021-02-04 ENCOUNTER — TELEPHONE (OUTPATIENT)
Dept: INTERNAL MEDICINE | Age: 31
End: 2021-02-04

## 2021-02-04 NOTE — TELEPHONE ENCOUNTER
Left message for pt to call.   Was returning pt call (pt stated wasn't feeling better after 2 days of treatment)

## 2021-02-05 LAB — THROAT CULTURE: NORMAL

## 2021-12-22 DIAGNOSIS — F41.1 GAD (GENERALIZED ANXIETY DISORDER): ICD-10-CM

## 2021-12-22 NOTE — TELEPHONE ENCOUNTER
Comments: lmom that pt is due for appt and 30 day supply can be sent to cover until seen    Last Office Visit (last PCP visit):   12/16/20    Next Visit Date:  No future appointments. **If hasn't been seen in over a year OR hasn't followed up according to last diabetes/ADHD visit, make appointment for patient before sending refill to provider.     Rx requested:  Requested Prescriptions     Pending Prescriptions Disp Refills    PARoxetine (PAXIL) 30 MG tablet 90 tablet 3     Sig: TAKE 1 TABLETS BY MOUTH ONCE DAILY

## 2021-12-28 ENCOUNTER — E-VISIT (OUTPATIENT)
Dept: INTERNAL MEDICINE | Age: 31
End: 2021-12-28
Payer: COMMERCIAL

## 2021-12-28 DIAGNOSIS — F41.1 GAD (GENERALIZED ANXIETY DISORDER): Primary | ICD-10-CM

## 2021-12-28 PROCEDURE — 99421 OL DIG E/M SVC 5-10 MIN: CPT | Performed by: PHYSICIAN ASSISTANT

## 2021-12-28 RX ORDER — PAROXETINE 30 MG/1
TABLET, FILM COATED ORAL
Qty: 30 TABLET | Refills: 0 | Status: SHIPPED | OUTPATIENT
Start: 2021-12-28 | End: 2021-12-28 | Stop reason: SDUPTHER

## 2021-12-28 RX ORDER — PAROXETINE 30 MG/1
TABLET, FILM COATED ORAL
Qty: 30 TABLET | Refills: 10 | Status: SHIPPED | OUTPATIENT
Start: 2021-12-28 | End: 2022-01-28

## 2021-12-28 ASSESSMENT — PATIENT HEALTH QUESTIONNAIRE - PHQ9
1. LITTLE INTEREST OR PLEASURE IN DOING THINGS: 1
6. FEELING BAD ABOUT YOURSELF - OR THAT YOU ARE A FAILURE OR HAVE LET YOURSELF OR YOUR FAMILY DOWN: 0
9. THOUGHTS THAT YOU WOULD BE BETTER OFF DEAD, OR OF HURTING YOURSELF: 0
8. MOVING OR SPEAKING SO SLOWLY THAT OTHER PEOPLE COULD HAVE NOTICED. OR THE OPPOSITE - BEING SO FIDGETY OR RESTLESS THAT YOU HAVE BEEN MOVING AROUND A LOT MORE THAN USUAL: NOT AT ALL
9. THOUGHTS THAT YOU WOULD BE BETTER OFF DEAD, OR OF HURTING YOURSELF: NOT AT ALL
4. FEELING TIRED OR HAVING LITTLE ENERGY: NEARLY EVERY DAY
6. FEELING BAD ABOUT YOURSELF - OR THAT YOU ARE A FAILURE OR HAVE LET YOURSELF OR YOUR FAMILY DOWN: NOT AT ALL
SUM OF ALL RESPONSES TO PHQ QUESTIONS 1-9: 7
4. FEELING TIRED OR HAVING LITTLE ENERGY: 3
10. IF YOU CHECKED OFF ANY PROBLEMS, HOW DIFFICULT HAVE THESE PROBLEMS MADE IT FOR YOU TO DO YOUR WORK, TAKE CARE OF THINGS AT HOME, OR GET ALONG WITH OTHER PEOPLE: SOMEWHAT DIFFICULT
5. POOR APPETITE OR OVEREATING: NOT AT ALL
SUM OF ALL RESPONSES TO PHQ QUESTIONS 1-9: 7
7. TROUBLE CONCENTRATING ON THINGS, SUCH AS READING THE NEWSPAPER OR WATCHING TELEVISION: 0
SUM OF ALL RESPONSES TO PHQ QUESTIONS 1-9: 7
5. POOR APPETITE OR OVEREATING: 0
3. TROUBLE FALLING OR STAYING ASLEEP: MORE THAN HALF THE DAYS
8. MOVING OR SPEAKING SO SLOWLY THAT OTHER PEOPLE COULD HAVE NOTICED. OR THE OPPOSITE, BEING SO FIGETY OR RESTLESS THAT YOU HAVE BEEN MOVING AROUND A LOT MORE THAN USUAL: 0
2. FEELING DOWN, DEPRESSED OR HOPELESS: 1
1. LITTLE INTEREST OR PLEASURE IN DOING THINGS: SEVERAL DAYS
7. TROUBLE CONCENTRATING ON THINGS, SUCH AS READING THE NEWSPAPER OR WATCHING TELEVISION: NOT AT ALL
10. IF YOU CHECKED OFF ANY PROBLEMS, HOW DIFFICULT HAVE THESE PROBLEMS MADE IT FOR YOU TO DO YOUR WORK, TAKE CARE OF THINGS AT HOME, OR GET ALONG WITH OTHER PEOPLE: 1
2. FEELING DOWN, DEPRESSED OR HOPELESS: SEVERAL DAYS
SUM OF ALL RESPONSES TO PHQ9 QUESTIONS 1 & 2: 2
SUM OF ALL RESPONSES TO PHQ QUESTIONS 1-9: 7
3. TROUBLE FALLING OR STAYING ASLEEP: 2

## 2021-12-28 ASSESSMENT — PATIENT HEALTH QUESTIONNAIRE - GENERAL
HAVE YOU BEEN EXPERIENCING RACING THOUGHTS OR IMPULSIVE BEHAVIOR: N
HAVE YOU BEEN EXPERIENCING NEW OR WORSENING DIFFICULTY WITH URINATION OR CHANGE IN URINARY PATTERN: N
HAVE YOU BEEN EXPERIENCING VERY LOW OR VERY HIGH MOODS: N
HAVE YOU BEEN EXPERIENCING VIVID DREAMS OR NIGHTMARES THAT INTERFERE WITH YOUR SLEEP: N
HAVE YOU BEEN EXPERIENCING INVOLUNTARY WEIGHT GAIN OR LOSS: N
HAVE YOU BEEN EXPERIENCING NEW OR WORSENING PROBLEMS WITH YOUR SEXUAL FUNCTION: N
HAVE YOU BEEN EXPERIENCING NEW OR WORSENING VISUAL CHANGES: N
HAVE YOU BEEN EXPERIENCING HALLUCINATIONS OR CONFUSION: N
HAVE YOU BEEN EXPERIENCING ABDOMINAL DISCOMFORT, NAUSEA OR CHANGES IN YOUR BOWEL PATTERN: N
HAVE YOU BEEN EXPERIENCING UNEXPLAINED HIGH FEVERS OR EXCESSIVE SWEATING: N
HAVE YOU BEEN EXPERIENCING NEW OR WORSENING HEADACHES: N
HAVE YOU BEEN EXPERIENCING LIGHT HEADEDNESS, WOOZINESS, OR DIZZINESS, ESPECIALLY UPON STANDING FROM SITTING OR LYING POSITIONS: N
HAVE YOU BEEN EXPERIENCING AN UNUSUALLY DRY MOUTH: N
DO YOU HAVE ANY NEW RASHES OR UNEXPLAINED ITCHING OR SWELLING: N
SINCE YOUR LAST VISIT, HAVE YOU EXPERIENCED EPISODES OF FAINTING OR NEAR FAINTING: N
DO YOU HAVE A FASTER HEART RATE THAN USUAL, DOES YOUR HEART RATE FEEL IRREGULAR OR DO YOU FEEL LIKE YOUR HEART IS POUNDING AT REST: N
HAVE YOU BEEN EXPERIENCING NEW OR WORSENING MUSCLE TWITCHING, SHAKINESS, OR OTHER UNUSUAL CHANGES IN YOUR MUSCLE MOVEMENT: N

## 2021-12-28 ASSESSMENT — ANXIETY QUESTIONNAIRES
1. FEELING NERVOUS, ANXIOUS, OR ON EDGE: SEVERAL DAYS
3. WORRYING TOO MUCH ABOUT DIFFERENT THINGS: 0-NOT AT ALL
GAD7 TOTAL SCORE: 3
3. WORRYING TOO MUCH ABOUT DIFFERENT THINGS: NOT AT ALL
4. TROUBLE RELAXING: NOT AT ALL
2. NOT BEING ABLE TO STOP OR CONTROL WORRYING: 0-NOT AT ALL
6. BECOMING EASILY ANNOYED OR IRRITABLE: OVER HALF THE DAYS
5. BEING SO RESTLESS THAT IT IS HARD TO SIT STILL: NOT AT ALL
1. FEELING NERVOUS, ANXIOUS, OR ON EDGE: 1-SEVERAL DAYS
2. NOT BEING ABLE TO STOP OR CONTROL WORRYING: NOT AT ALL
4. TROUBLE RELAXING: 0-NOT AT ALL
5. BEING SO RESTLESS THAT IT IS HARD TO SIT STILL: 0-NOT AT ALL
GAD7 TOTAL SCORE: 0
7. FEELING AFRAID AS IF SOMETHING AWFUL MIGHT HAPPEN: 0-NOT AT ALL
7. FEELING AFRAID AS IF SOMETHING AWFUL MIGHT HAPPEN: NOT AT ALL
6. BECOMING EASILY ANNOYED OR IRRITABLE: 2-OVER HALF THE DAYS

## 2021-12-28 NOTE — PROGRESS NOTES
HPI: per patient questionnaire  Exam: not applicable     Diagnoses and all orders for this visit:    KINSEY (generalized anxiety disorder)  -     PARoxetine (PAXIL) 30 MG tablet; TAKE 1 TABLETS BY MOUTH ONCE DAILY        Patient reports anxiety is stable on current dose  Continue current dose  Follow-up in 1 year and as needed  5-10 minutes were spent on the digital evaluation and management of this patient.       Electronically signed by ADELSO Arroyo on 12/28/2021 at 3:22 PM

## 2022-01-28 DIAGNOSIS — F41.1 GAD (GENERALIZED ANXIETY DISORDER): ICD-10-CM

## 2022-01-28 NOTE — TELEPHONE ENCOUNTER
Comments:     Last Office Visit (last PCP visit):   12/28/2021    Next Visit Date:  No future appointments. **If hasn't been seen in over a year OR hasn't followed up according to last diabetes/ADHD visit, make appointment for patient before sending refill to provider.     Rx requested:  Requested Prescriptions     Pending Prescriptions Disp Refills    PARoxetine (PAXIL) 30 MG tablet [Pharmacy Med Name: PARoxetine HCl 30 MG Oral Tablet] 30 tablet 0     Sig: Take 1 tablet by mouth once daily

## 2022-01-31 DIAGNOSIS — F41.1 GAD (GENERALIZED ANXIETY DISORDER): ICD-10-CM

## 2022-01-31 RX ORDER — PAROXETINE 30 MG/1
TABLET, FILM COATED ORAL
Qty: 30 TABLET | Refills: 10 | OUTPATIENT
Start: 2022-01-31

## 2022-01-31 NOTE — TELEPHONE ENCOUNTER
Comments: pharmacy notified that this rx has refills. Last Office Visit (last PCP visit):   12/28/2021 anxiety    Next Visit Date:  No future appointments. **If hasn't been seen in over a year OR hasn't followed up according to last diabetes/ADHD visit, make appointment for patient before sending refill to provider.     Rx requested:  Requested Prescriptions     Pending Prescriptions Disp Refills    PARoxetine (PAXIL) 30 MG tablet 30 tablet 10     Sig: TAKE 1 TABLETS BY MOUTH ONCE DAILY

## 2022-02-02 RX ORDER — PAROXETINE 30 MG/1
TABLET, FILM COATED ORAL
Qty: 90 TABLET | Refills: 1 | Status: SHIPPED | OUTPATIENT
Start: 2022-02-02

## 2023-02-22 ENCOUNTER — OFFICE VISIT (OUTPATIENT)
Dept: FAMILY MEDICINE CLINIC | Age: 33
End: 2023-02-22
Payer: COMMERCIAL

## 2023-02-22 VITALS
HEIGHT: 66 IN | TEMPERATURE: 100.4 F | WEIGHT: 134 LBS | DIASTOLIC BLOOD PRESSURE: 74 MMHG | HEART RATE: 80 BPM | BODY MASS INDEX: 21.53 KG/M2 | OXYGEN SATURATION: 95 % | SYSTOLIC BLOOD PRESSURE: 118 MMHG

## 2023-02-22 DIAGNOSIS — U07.1 COVID-19: Primary | ICD-10-CM

## 2023-02-22 DIAGNOSIS — B34.9 VIRAL ILLNESS: ICD-10-CM

## 2023-02-22 DIAGNOSIS — J10.1 INFLUENZA A: ICD-10-CM

## 2023-02-22 LAB
INFLUENZA A ANTIBODY: POSITIVE
INFLUENZA B ANTIBODY: NEGATIVE
Lab: ABNORMAL
PERFORMING INSTRUMENT: ABNORMAL
QC PASS/FAIL: ABNORMAL
SARS-COV-2, POC: DETECTED

## 2023-02-22 PROCEDURE — G8484 FLU IMMUNIZE NO ADMIN: HCPCS | Performed by: NURSE PRACTITIONER

## 2023-02-22 PROCEDURE — 99213 OFFICE O/P EST LOW 20 MIN: CPT | Performed by: NURSE PRACTITIONER

## 2023-02-22 PROCEDURE — G8420 CALC BMI NORM PARAMETERS: HCPCS | Performed by: NURSE PRACTITIONER

## 2023-02-22 PROCEDURE — 4004F PT TOBACCO SCREEN RCVD TLK: CPT | Performed by: NURSE PRACTITIONER

## 2023-02-22 PROCEDURE — G8427 DOCREV CUR MEDS BY ELIG CLIN: HCPCS | Performed by: NURSE PRACTITIONER

## 2023-02-22 PROCEDURE — 87804 INFLUENZA ASSAY W/OPTIC: CPT | Performed by: NURSE PRACTITIONER

## 2023-02-22 PROCEDURE — 87426 SARSCOV CORONAVIRUS AG IA: CPT | Performed by: NURSE PRACTITIONER

## 2023-02-22 RX ORDER — ONDANSETRON 4 MG/1
4 TABLET, ORALLY DISINTEGRATING ORAL 3 TIMES DAILY PRN
Qty: 21 TABLET | Refills: 0 | Status: CANCELLED | OUTPATIENT
Start: 2023-02-22

## 2023-02-22 RX ORDER — IBUPROFEN 600 MG/1
600 TABLET ORAL EVERY 6 HOURS PRN
Qty: 30 TABLET | Refills: 0 | Status: SHIPPED | OUTPATIENT
Start: 2023-02-22

## 2023-02-22 SDOH — ECONOMIC STABILITY: INCOME INSECURITY: HOW HARD IS IT FOR YOU TO PAY FOR THE VERY BASICS LIKE FOOD, HOUSING, MEDICAL CARE, AND HEATING?: NOT HARD AT ALL

## 2023-02-22 SDOH — ECONOMIC STABILITY: HOUSING INSECURITY
IN THE LAST 12 MONTHS, WAS THERE A TIME WHEN YOU DID NOT HAVE A STEADY PLACE TO SLEEP OR SLEPT IN A SHELTER (INCLUDING NOW)?: NO

## 2023-02-22 SDOH — ECONOMIC STABILITY: FOOD INSECURITY: WITHIN THE PAST 12 MONTHS, YOU WORRIED THAT YOUR FOOD WOULD RUN OUT BEFORE YOU GOT MONEY TO BUY MORE.: NEVER TRUE

## 2023-02-22 SDOH — ECONOMIC STABILITY: FOOD INSECURITY: WITHIN THE PAST 12 MONTHS, THE FOOD YOU BOUGHT JUST DIDN'T LAST AND YOU DIDN'T HAVE MONEY TO GET MORE.: NEVER TRUE

## 2023-02-22 ASSESSMENT — ENCOUNTER SYMPTOMS
NAUSEA: 0
ABDOMINAL PAIN: 0
SORE THROAT: 0
WHEEZING: 0
COUGH: 0
VOMITING: 1
SHORTNESS OF BREATH: 0
BACK PAIN: 0
CHEST TIGHTNESS: 0
DIARRHEA: 0
RHINORRHEA: 0

## 2023-02-22 ASSESSMENT — PATIENT HEALTH QUESTIONNAIRE - PHQ9
SUM OF ALL RESPONSES TO PHQ QUESTIONS 1-9: 0
3. TROUBLE FALLING OR STAYING ASLEEP: 0
7. TROUBLE CONCENTRATING ON THINGS, SUCH AS READING THE NEWSPAPER OR WATCHING TELEVISION: 0
SUM OF ALL RESPONSES TO PHQ QUESTIONS 1-9: 0
8. MOVING OR SPEAKING SO SLOWLY THAT OTHER PEOPLE COULD HAVE NOTICED. OR THE OPPOSITE, BEING SO FIGETY OR RESTLESS THAT YOU HAVE BEEN MOVING AROUND A LOT MORE THAN USUAL: 0
4. FEELING TIRED OR HAVING LITTLE ENERGY: 0
1. LITTLE INTEREST OR PLEASURE IN DOING THINGS: 0
SUM OF ALL RESPONSES TO PHQ QUESTIONS 1-9: 0
10. IF YOU CHECKED OFF ANY PROBLEMS, HOW DIFFICULT HAVE THESE PROBLEMS MADE IT FOR YOU TO DO YOUR WORK, TAKE CARE OF THINGS AT HOME, OR GET ALONG WITH OTHER PEOPLE: 0
SUM OF ALL RESPONSES TO PHQ9 QUESTIONS 1 & 2: 0
5. POOR APPETITE OR OVEREATING: 0
2. FEELING DOWN, DEPRESSED OR HOPELESS: 0
6. FEELING BAD ABOUT YOURSELF - OR THAT YOU ARE A FAILURE OR HAVE LET YOURSELF OR YOUR FAMILY DOWN: 0
9. THOUGHTS THAT YOU WOULD BE BETTER OFF DEAD, OR OF HURTING YOURSELF: 0
SUM OF ALL RESPONSES TO PHQ QUESTIONS 1-9: 0

## 2023-02-22 NOTE — LETTER
NOTIFICATION RETURN TO WORK / SCHOOL    2/22/2023    Mr. Dayana [place] [id number]      To Whom It May Concern:    Jas Sharma developed symptoms on 2/20. Ofelia Mcgovern was tested for COVID-19 and Influenza on 2/22, and the result was positive for both. He may return to work on 2/26. I recommend return without restrictions as long as they are symptom free for 24 hours without the use of over the counter, fever-reducing medications. He/she should wear a mask on days #6-10. However, if not symptom free after 5 days from the start of symptoms, then he must quarantine for the entire 10 days (thru 3/2)    If there are questions or concerns, please have the patient contact our office.     Sincerely,        MARIANA Eddy

## 2023-02-22 NOTE — PROGRESS NOTES
Scott Ddoson (:  1990) is a 33 y.o. male, Established patient, here for evaluation of the following chief complaint(s): Other (Fever, emesis, headache, neck pain ongoing 4 to 5 days)      Vitals:    23 1058   BP: 118/74   Pulse: 80   Temp: 100.4 °F (38 °C)   SpO2: 95%       ASSESSMENT/PLAN:  1. COVID-19  - Self quarantine, only going out for Dr's appts/essentials  - OTC symptom control  - Continue to wear mask, social distance, and wash hands frequently  - Call 911 or go to the ER should symptoms become difficult to manage    2. Influenza A       - declined Zofran       - OTC symptom control    3. Viral illness  -     POCT COVID-19, Antigen - POS  -     POCT Influenza A/B - POS        Return if symptoms worsen or fail to improve. SUBJECTIVE/OBJECTIVE:    Other  This is a new problem. Episode onset: symptoms started monday night, x2 days. The problem occurs constantly. The problem has been gradually worsening. Associated symptoms include a fever, headaches, neck pain and vomiting. Pertinent negatives include no abdominal pain, arthralgias, chest pain, chills, congestion, coughing, fatigue, myalgias, nausea or sore throat. The symptoms are aggravated by exertion. He has tried NSAIDs and acetaminophen for the symptoms. The treatment provided mild relief. Review of Systems   Constitutional:  Positive for fever. Negative for chills and fatigue. HENT:  Negative for congestion, ear pain, rhinorrhea and sore throat. Respiratory:  Negative for cough, chest tightness, shortness of breath and wheezing. Cardiovascular:  Negative for chest pain, palpitations and leg swelling. Gastrointestinal:  Positive for vomiting. Negative for abdominal pain, diarrhea and nausea. Musculoskeletal:  Positive for neck pain. Negative for arthralgias, back pain and myalgias. Neurological:  Positive for headaches. Negative for dizziness and light-headedness. Physical Exam  Vitals reviewed. Constitutional:       General: He is not in acute distress. Appearance: He is well-developed. HENT:      Right Ear: Tympanic membrane normal.      Left Ear: Tympanic membrane normal.      Nose: Nose normal.      Mouth/Throat:      Lips: Pink. Mouth: Mucous membranes are moist.      Pharynx: Oropharynx is clear. Eyes:      General: Lids are normal.      Extraocular Movements: Extraocular movements intact. Conjunctiva/sclera: Conjunctivae normal.      Pupils: Pupils are equal, round, and reactive to light. Cardiovascular:      Rate and Rhythm: Normal rate and regular rhythm. Heart sounds: Normal heart sounds, S1 normal and S2 normal.   Pulmonary:      Effort: Pulmonary effort is normal. No respiratory distress. Breath sounds: Normal air entry. No decreased breath sounds, wheezing, rhonchi or rales. Abdominal:      General: Bowel sounds are normal.      Palpations: Abdomen is soft. Musculoskeletal:         General: No deformity. Normal range of motion. Cervical back: Full passive range of motion without pain. Skin:     General: Skin is warm and dry. Neurological:      Mental Status: He is alert and oriented to person, place, and time. Psychiatric:         Attention and Perception: Attention normal.         Behavior: Behavior is cooperative. An electronic signature was used to authenticate this note.     --MARIANA Mandujano

## 2023-06-05 DIAGNOSIS — F41.1 GAD (GENERALIZED ANXIETY DISORDER): ICD-10-CM

## 2023-06-06 ENCOUNTER — TELEPHONE (OUTPATIENT)
Dept: INTERNAL MEDICINE | Age: 33
End: 2023-06-06

## 2023-06-08 RX ORDER — PAROXETINE 30 MG/1
TABLET, FILM COATED ORAL
Qty: 30 TABLET | Refills: 0 | Status: SHIPPED | OUTPATIENT
Start: 2023-06-08 | End: 2023-06-16 | Stop reason: SDUPTHER

## 2023-06-08 NOTE — TELEPHONE ENCOUNTER
Comments:     Last Office Visit (last PCP visit):   12/28/2021    Next Visit Date:  Future Appointments   Date Time Provider [address]   6/16/2023  1:30 PM [id number] [facility]       **If hasn't been seen in over a year OR hasn't followed up according to last diabetes/ADHD visit, make appointment for patient before sending refill to provider.     Rx requested:  Requested Prescriptions     Pending Prescriptions Disp Refills    PARoxetine (PAXIL) 30 MG tablet 90 tablet 1     Sig: Take 1 tablet by mouth once daily

## 2023-06-16 PROBLEM — Z87.891 PERSONAL HISTORY OF TOBACCO USE: Status: ACTIVE | Noted: 2017-05-02

## 2023-06-28 ENCOUNTER — NURSE TRIAGE (OUTPATIENT)
Dept: OTHER | Facility: CLINIC | Age: 33
End: 2023-06-28

## 2023-06-28 ENCOUNTER — OFFICE VISIT (OUTPATIENT)
Dept: FAMILY MEDICINE CLINIC | Age: 33
End: 2023-06-28
Payer: COMMERCIAL

## 2023-06-28 VITALS
BODY MASS INDEX: 21.73 KG/M2 | RESPIRATION RATE: 16 BRPM | TEMPERATURE: 98.7 F | WEIGHT: 134.6 LBS | OXYGEN SATURATION: 99 % | SYSTOLIC BLOOD PRESSURE: 116 MMHG | HEART RATE: 84 BPM | DIASTOLIC BLOOD PRESSURE: 74 MMHG

## 2023-06-28 DIAGNOSIS — S29.011A INTERCOSTAL MUSCLE STRAIN, INITIAL ENCOUNTER: Primary | ICD-10-CM

## 2023-06-28 PROCEDURE — 99213 OFFICE O/P EST LOW 20 MIN: CPT | Performed by: NURSE PRACTITIONER

## 2023-06-28 PROCEDURE — G8420 CALC BMI NORM PARAMETERS: HCPCS | Performed by: NURSE PRACTITIONER

## 2023-06-28 PROCEDURE — G8427 DOCREV CUR MEDS BY ELIG CLIN: HCPCS | Performed by: NURSE PRACTITIONER

## 2023-06-28 PROCEDURE — 4004F PT TOBACCO SCREEN RCVD TLK: CPT | Performed by: NURSE PRACTITIONER

## 2023-06-28 RX ORDER — METHYLPREDNISOLONE 4 MG/1
TABLET ORAL
Qty: 1 KIT | Refills: 0 | Status: SHIPPED | OUTPATIENT
Start: 2023-06-28 | End: 2023-07-04

## 2023-06-28 RX ORDER — CYCLOBENZAPRINE HCL 10 MG
10 TABLET ORAL 3 TIMES DAILY PRN
Qty: 21 TABLET | Refills: 0 | Status: SHIPPED | OUTPATIENT
Start: 2023-06-28 | End: 2023-07-08

## 2023-06-28 ASSESSMENT — ENCOUNTER SYMPTOMS
COUGH: 0
COLOR CHANGE: 0
BACK PAIN: 1
WHEEZING: 0
SHORTNESS OF BREATH: 0

## 2024-04-19 ENCOUNTER — OFFICE VISIT (OUTPATIENT)
Dept: FAMILY MEDICINE CLINIC | Age: 34
End: 2024-04-19
Payer: COMMERCIAL

## 2024-04-19 VITALS
DIASTOLIC BLOOD PRESSURE: 70 MMHG | HEIGHT: 66 IN | OXYGEN SATURATION: 99 % | WEIGHT: 140 LBS | HEART RATE: 77 BPM | SYSTOLIC BLOOD PRESSURE: 132 MMHG | TEMPERATURE: 98.4 F | BODY MASS INDEX: 22.5 KG/M2

## 2024-04-19 DIAGNOSIS — M54.31 SCIATICA OF RIGHT SIDE: Primary | ICD-10-CM

## 2024-04-19 PROCEDURE — G8427 DOCREV CUR MEDS BY ELIG CLIN: HCPCS | Performed by: NURSE PRACTITIONER

## 2024-04-19 PROCEDURE — 4004F PT TOBACCO SCREEN RCVD TLK: CPT | Performed by: NURSE PRACTITIONER

## 2024-04-19 PROCEDURE — G8420 CALC BMI NORM PARAMETERS: HCPCS | Performed by: NURSE PRACTITIONER

## 2024-04-19 PROCEDURE — 99213 OFFICE O/P EST LOW 20 MIN: CPT | Performed by: NURSE PRACTITIONER

## 2024-04-19 RX ORDER — CYCLOBENZAPRINE HCL 10 MG
10 TABLET ORAL 3 TIMES DAILY PRN
Qty: 21 TABLET | Refills: 0 | Status: SHIPPED | OUTPATIENT
Start: 2024-04-19 | End: 2024-04-29

## 2024-04-19 RX ORDER — METHYLPREDNISOLONE 4 MG/1
TABLET ORAL
Qty: 1 KIT | Refills: 0 | Status: SHIPPED | OUTPATIENT
Start: 2024-04-19

## 2024-04-19 ASSESSMENT — ENCOUNTER SYMPTOMS
COUGH: 0
SHORTNESS OF BREATH: 0
WHEEZING: 0
COLOR CHANGE: 0
BACK PAIN: 1

## 2024-04-19 NOTE — PROGRESS NOTES
Wayne Laboy (:  1990) is a 33 y.o. male, Established patient, here for evaluation of the following chief complaint(s):  Back Pain (One month )      Vitals:    24 0924   BP: 132/70   Pulse: 77   Temp: 98.4 °F (36.9 °C)   SpO2: 99%       ASSESSMENT/PLAN:  1. Sciatica of right side  -     methylPREDNISolone (MEDROL, ORACIO,) 4 MG tablet; Take by mouth., Disp-1 kit, R-0Normal  -     cyclobenzaprine (FLEXERIL) 10 MG tablet; Take 1 tablet by mouth 3 times daily as needed for Muscle spasms, Disp-21 tablet, R-0Normal        -     discussed options for PT or chiropractor.         -     2nd opinion chiropractor given ([facility], [place])    Return if symptoms worsen or fail to improve.    SUBJECTIVE/OBJECTIVE:    Back Pain  This is a new problem. Episode onset: x1 month has had right lower back pain that radiates down toward the buttocks and shoot down the front of his leg. The problem occurs constantly. The problem is unchanged. The pain is present in the lumbar spine and sacro-iliac. The quality of the pain is described as shooting. The pain radiates to the right thigh. The pain is at a severity of 6/10. The pain is moderate. The symptoms are aggravated by bending, position, sitting, standing, stress and twisting. Associated symptoms include tingling (sometimes leg feels like its falling asleep). Pertinent negatives include no chest pain, fever, headaches, numbness or weakness. He has tried NSAIDs and chiropractic manipulation for the symptoms. The treatment provided mild (takes the edge off) relief.       Review of Systems   Constitutional:  Negative for chills, fatigue and fever.   Respiratory:  Negative for cough, shortness of breath and wheezing.    Cardiovascular:  Negative for chest pain and palpitations.   Musculoskeletal:  Positive for back pain and myalgias. Negative for arthralgias and joint swelling.   Skin:  Negative for color change, pallor and wound.   Neurological:  Positive

## 2024-10-11 ENCOUNTER — OFFICE VISIT (OUTPATIENT)
Dept: INTERNAL MEDICINE | Age: 34
End: 2024-10-11

## 2024-10-11 VITALS
DIASTOLIC BLOOD PRESSURE: 72 MMHG | WEIGHT: 142 LBS | HEART RATE: 75 BPM | OXYGEN SATURATION: 97 % | SYSTOLIC BLOOD PRESSURE: 108 MMHG | BODY MASS INDEX: 20.33 KG/M2 | HEIGHT: 70 IN | TEMPERATURE: 97.3 F

## 2024-10-11 DIAGNOSIS — Z87.891 PERSONAL HISTORY OF TOBACCO USE: ICD-10-CM

## 2024-10-11 DIAGNOSIS — F41.1 GAD (GENERALIZED ANXIETY DISORDER): ICD-10-CM

## 2024-10-11 DIAGNOSIS — Z11.1 SCREENING FOR TUBERCULOSIS: Primary | ICD-10-CM

## 2024-10-11 PROCEDURE — 86580 TB INTRADERMAL TEST: CPT | Performed by: STUDENT IN AN ORGANIZED HEALTH CARE EDUCATION/TRAINING PROGRAM

## 2024-10-11 RX ORDER — PAROXETINE 20 MG/1
20 TABLET, FILM COATED ORAL DAILY
Qty: 30 TABLET | Refills: 1 | Status: SHIPPED | OUTPATIENT
Start: 2024-10-11

## 2024-10-11 SDOH — ECONOMIC STABILITY: FOOD INSECURITY: WITHIN THE PAST 12 MONTHS, YOU WORRIED THAT YOUR FOOD WOULD RUN OUT BEFORE YOU GOT MONEY TO BUY MORE.: NEVER TRUE

## 2024-10-11 SDOH — HEALTH STABILITY: PHYSICAL HEALTH: ON AVERAGE, HOW MANY MINUTES DO YOU ENGAGE IN EXERCISE AT THIS LEVEL?: 90 MIN

## 2024-10-11 SDOH — HEALTH STABILITY: PHYSICAL HEALTH: ON AVERAGE, HOW MANY DAYS PER WEEK DO YOU ENGAGE IN MODERATE TO STRENUOUS EXERCISE (LIKE A BRISK WALK)?: 5 DAYS

## 2024-10-11 SDOH — ECONOMIC STABILITY: FOOD INSECURITY: WITHIN THE PAST 12 MONTHS, THE FOOD YOU BOUGHT JUST DIDN'T LAST AND YOU DIDN'T HAVE MONEY TO GET MORE.: NEVER TRUE

## 2024-10-11 ASSESSMENT — PATIENT HEALTH QUESTIONNAIRE - PHQ9
2. FEELING DOWN, DEPRESSED OR HOPELESS: NOT AT ALL
SUM OF ALL RESPONSES TO PHQ9 QUESTIONS 1 & 2: 0
SUM OF ALL RESPONSES TO PHQ QUESTIONS 1-9: 0
1. LITTLE INTEREST OR PLEASURE IN DOING THINGS: NOT AT ALL
SUM OF ALL RESPONSES TO PHQ QUESTIONS 1-9: 0

## 2024-10-11 NOTE — PROGRESS NOTES
Tuberculin skin test applied to Left ventral forearm. Explained how to read the test, measuring induration not just erythema; he will come into office if test appears positive.     Taltz Counseling: I discussed with the patient the risks of ixekizumab including but not limited to immunosuppression, serious infections, worsening of inflammatory bowel disease and drug reactions.  The patient understands that monitoring is required including a PPD at baseline and must alert us or the primary physician if symptoms of infection or other concerning signs are noted.

## 2024-10-11 NOTE — PROGRESS NOTES
10/11/2024    Wayne Laboy (:  1990) is a 33 y.o. male, here for a preventive medicine evaluation.He is a 34 yo male with pmh of tobacco use, KINSEY. Pt notes the Paxil may not be working as well as it used to. He feels more irritable since being on this dose. He feels it may be causing him to feel less motivated. He is declining lab work today.     Discussed smoking cessation. He is still smoking 1 ppd. Has previously tried laser therapy. Has never been on Wellbutrin.     Subjective   Patient Active Problem List   Diagnosis    Regular astigmatism    Personal history of tobacco use    KINSEY (generalized anxiety disorder)    Screening for tuberculosis       Review of Systems   Constitutional:         See HPI for ROS         Prior to Visit Medications    Medication Sig Taking? Authorizing Provider   PARoxetine (PAXIL) 20 MG tablet Take 1 tablet by mouth daily Yes Aleena Mata,    methylPREDNISolone (MEDROL, ORACIO,) 4 MG tablet Take by mouth.  Patient not taking: Reported on 10/11/2024  Tatum Sloan APRN        No Known Allergies    Past Medical History:   Diagnosis Date    Anxiety     Depression     Tobacco use 2017       No past surgical history on file.    Social History     Socioeconomic History    Marital status:      Spouse name: Not on file    Number of children: Not on file    Years of education: Not on file    Highest education level: Not on file   Occupational History    Occupation:     Tobacco Use    Smoking status: Every Day     Current packs/day: 0.50     Average packs/day: 0.5 packs/day for 16.8 years (8.4 ttl pk-yrs)     Types: Cigarettes     Start date: 2008    Smokeless tobacco: Former    Tobacco comments:     Would like to quit smoking   Vaping Use    Vaping status: Never Used   Substance and Sexual Activity    Alcohol use: No    Drug use: No    Sexual activity: Yes     Partners: Female   Other Topics Concern    Not on file   Social History Narrative

## 2024-10-11 NOTE — ASSESSMENT & PLAN NOTE
1 step PPD today for work requirements   Pt to return on Monday     Orders:    Mantoux testing     Quality 226: Preventive Care And Screening: Tobacco Use: Screening And Cessation Intervention: Tobacco Screening not Performed Quality 130: Documentation Of Current Medications In The Medical Record: Current Medications Documented Quality 431: Preventive Care And Screening: Unhealthy Alcohol Use - Screening: Patient not identified as an unhealthy alcohol user when screened for unhealthy alcohol use using a systematic screening method Detail Level: Detailed

## 2024-10-11 NOTE — ASSESSMENT & PLAN NOTE
He feels unmotivated on Paxil, would like to wean off this  Reduce dose to 20 mg daily   F/u with me in 1 month  Consider Wellbutrin as alternative     Orders:    PARoxetine (PAXIL) 20 MG tablet; Take 1 tablet by mouth daily

## 2024-10-14 ENCOUNTER — NURSE ONLY (OUTPATIENT)
Dept: INTERNAL MEDICINE | Age: 34
End: 2024-10-14

## 2024-10-14 DIAGNOSIS — Z11.1 PPD NEGATIVE: Primary | ICD-10-CM

## 2024-10-14 LAB
INDURATION: 0
TB SKIN TEST: NEGATIVE

## 2024-10-14 NOTE — PROGRESS NOTES
PPD Reading Note  PPD read and results entered in [employer].  Result: 0 mm induration.  Interpretation: negative  If test not read within 48-72 hours of initial placement, patient advised to repeat in other arm 1-3 weeks after this test.  Allergic reaction: no

## 2024-11-10 PROBLEM — Z11.1 SCREENING FOR TUBERCULOSIS: Status: RESOLVED | Noted: 2024-10-11 | Resolved: 2024-11-10

## 2024-12-14 DIAGNOSIS — F41.1 GAD (GENERALIZED ANXIETY DISORDER): ICD-10-CM

## 2024-12-16 NOTE — TELEPHONE ENCOUNTER
Comments: called pt  and pt maria t schedule virtual appt    Last Office Visit (last PCP visit):   10/11/2024    Next Visit Date:  No future appointments.    **If hasn't been seen in over a year OR hasn't followed up according to last diabetes/ADHD visit, make appointment for patient before sending refill to provider.    Rx requested:  Requested Prescriptions     Pending Prescriptions Disp Refills    PARoxetine (PAXIL) 20 MG tablet 30 tablet 1     Sig: Take 1 tablet by mouth daily

## 2024-12-18 RX ORDER — PAROXETINE 20 MG/1
20 TABLET, FILM COATED ORAL DAILY
Qty: 30 TABLET | Refills: 1 | Status: SHIPPED | OUTPATIENT
Start: 2024-12-18

## 2024-12-30 ENCOUNTER — OFFICE VISIT (OUTPATIENT)
Dept: INTERNAL MEDICINE | Age: 34
End: 2024-12-30

## 2024-12-30 VITALS
TEMPERATURE: 97.4 F | SYSTOLIC BLOOD PRESSURE: 118 MMHG | WEIGHT: 141.6 LBS | OXYGEN SATURATION: 97 % | DIASTOLIC BLOOD PRESSURE: 78 MMHG | HEART RATE: 102 BPM | BODY MASS INDEX: 20.61 KG/M2

## 2024-12-30 DIAGNOSIS — F41.1 GAD (GENERALIZED ANXIETY DISORDER): Primary | ICD-10-CM

## 2024-12-30 PROCEDURE — 99213 OFFICE O/P EST LOW 20 MIN: CPT | Performed by: STUDENT IN AN ORGANIZED HEALTH CARE EDUCATION/TRAINING PROGRAM

## 2024-12-30 RX ORDER — PAROXETINE 30 MG/1
30 TABLET, FILM COATED ORAL EVERY MORNING
Qty: 90 TABLET | Refills: 1 | Status: SHIPPED | OUTPATIENT
Start: 2024-12-30

## 2024-12-30 RX ORDER — BUPROPION HYDROCHLORIDE 150 MG/1
150 TABLET ORAL EVERY MORNING
Qty: 30 TABLET | Refills: 0 | Status: SHIPPED | OUTPATIENT
Start: 2024-12-30

## 2024-12-30 ASSESSMENT — ANXIETY QUESTIONNAIRES
7. FEELING AFRAID AS IF SOMETHING AWFUL MIGHT HAPPEN: NEARLY EVERY DAY
6. BECOMING EASILY ANNOYED OR IRRITABLE: SEVERAL DAYS
IF YOU CHECKED OFF ANY PROBLEMS ON THIS QUESTIONNAIRE, HOW DIFFICULT HAVE THESE PROBLEMS MADE IT FOR YOU TO DO YOUR WORK, TAKE CARE OF THINGS AT HOME, OR GET ALONG WITH OTHER PEOPLE: SOMEWHAT DIFFICULT
3. WORRYING TOO MUCH ABOUT DIFFERENT THINGS: NEARLY EVERY DAY
2. NOT BEING ABLE TO STOP OR CONTROL WORRYING: NEARLY EVERY DAY
4. TROUBLE RELAXING: SEVERAL DAYS
1. FEELING NERVOUS, ANXIOUS, OR ON EDGE: NEARLY EVERY DAY
5. BEING SO RESTLESS THAT IT IS HARD TO SIT STILL: NOT AT ALL
GAD7 TOTAL SCORE: 14

## 2024-12-30 NOTE — PROGRESS NOTES
[facility]  [facility]   [address] [place], [place] [id number]   P: [phone]      Wayne Laboy (:  1990) is a 34 y.o. male,Established patient, here for evaluation of the following chief complaint(s):  Anxiety (1 month follow up. )      Assessment & Plan   ASSESSMENT/PLAN:  1. KINSEY (generalized anxiety disorder)  Assessment & Plan:   Increase Paxil back to 30 mg daily    Add Wellbutrin  Discussed monitoring for Serotonin syndrome  Discussed AE including worsening anxiety or SI  F/u with me in 1 month to address response   Orders:  -     buPROPion (WELLBUTRIN XL) 150 MG extended release tablet; Take 1 tablet by mouth every morning, Disp-30 tablet, R-0Normal  -     PARoxetine (PAXIL) 30 MG tablet; Take 1 tablet by mouth every morning, Disp-90 tablet, R-1Normal      No results found for any visits on 24.     No follow-ups on file.         Subjective   SUBJECTIVE/OBJECTIVE:  HPI    Mr. Wayne Laboy is a 33 yo male with pmh of tobacco use, KINSEY who presents for follow up of anxiety. At last visit, we were reducing dose of his Paxil to 20 mg daily as he felt unmotivated on higher dose. We considered Wellbutrin as an alternative.     We went from 30 mg to 20 mg and patient developed vivid dreams. He notes he didn't feel himself, he felt paranoid.     He feels improved going back to 30 mg but remains unmotivated.         Review of Systems   Constitutional:         See HPI for ROS            Objective   Physical Exam  Constitutional:       Appearance: Normal appearance.   HENT:      Head: Normocephalic and atraumatic.   Eyes:      Extraocular Movements: Extraocular movements intact.   Cardiovascular:      Rate and Rhythm: Normal rate and regular rhythm.   Pulmonary:      Effort: Pulmonary effort is normal. No respiratory distress.      Breath sounds: Normal breath sounds. No wheezing.   Abdominal:      General: Bowel sounds are normal.      Palpations: Abdomen is

## 2024-12-31 NOTE — ASSESSMENT & PLAN NOTE
Increase Paxil back to 30 mg daily    Add Wellbutrin  Discussed monitoring for Serotonin syndrome  Discussed AE including worsening anxiety or SI  F/u with me in 1 month to address response

## 2025-02-05 DIAGNOSIS — F41.1 GAD (GENERALIZED ANXIETY DISORDER): ICD-10-CM

## 2025-02-05 RX ORDER — BUPROPION HYDROCHLORIDE 150 MG/1
150 TABLET ORAL EVERY MORNING
Qty: 90 TABLET | Refills: 1 | Status: SHIPPED | OUTPATIENT
Start: 2025-02-05

## 2025-06-14 ENCOUNTER — OFFICE VISIT (OUTPATIENT)
Dept: FAMILY MEDICINE CLINIC | Age: 35
End: 2025-06-14

## 2025-06-14 VITALS
TEMPERATURE: 99.4 F | OXYGEN SATURATION: 98 % | DIASTOLIC BLOOD PRESSURE: 70 MMHG | BODY MASS INDEX: 21.66 KG/M2 | SYSTOLIC BLOOD PRESSURE: 124 MMHG | HEART RATE: 98 BPM | HEIGHT: 67 IN | WEIGHT: 138 LBS

## 2025-06-14 DIAGNOSIS — J20.9 ACUTE BRONCHITIS, UNSPECIFIED ORGANISM: Primary | ICD-10-CM

## 2025-06-14 PROCEDURE — 99213 OFFICE O/P EST LOW 20 MIN: CPT | Performed by: PHYSICIAN ASSISTANT

## 2025-06-14 RX ORDER — AZITHROMYCIN 250 MG/1
TABLET, FILM COATED ORAL
Qty: 6 TABLET | Refills: 0 | Status: SHIPPED | OUTPATIENT
Start: 2025-06-14 | End: 2025-06-24

## 2025-06-14 RX ORDER — PREDNISONE 20 MG/1
20 TABLET ORAL
Qty: 5 TABLET | Refills: 0 | Status: SHIPPED | OUTPATIENT
Start: 2025-06-14 | End: 2025-06-19

## 2025-06-14 RX ORDER — GUAIFENESIN 600 MG/1
600 TABLET, EXTENDED RELEASE ORAL 2 TIMES DAILY
Qty: 14 TABLET | Refills: 0 | Status: SHIPPED | OUTPATIENT
Start: 2025-06-14 | End: 2025-06-21

## 2025-06-14 RX ORDER — ALBUTEROL SULFATE 90 UG/1
2 INHALANT RESPIRATORY (INHALATION) 4 TIMES DAILY
Qty: 18 G | Refills: 0 | Status: SHIPPED | OUTPATIENT
Start: 2025-06-14

## 2025-06-14 SDOH — ECONOMIC STABILITY: FOOD INSECURITY: WITHIN THE PAST 12 MONTHS, THE FOOD YOU BOUGHT JUST DIDN'T LAST AND YOU DIDN'T HAVE MONEY TO GET MORE.: NEVER TRUE

## 2025-06-14 SDOH — ECONOMIC STABILITY: FOOD INSECURITY: WITHIN THE PAST 12 MONTHS, YOU WORRIED THAT YOUR FOOD WOULD RUN OUT BEFORE YOU GOT MONEY TO BUY MORE.: NEVER TRUE

## 2025-06-14 ASSESSMENT — ENCOUNTER SYMPTOMS
COUGH: 1
SINUS PRESSURE: 1
GASTROINTESTINAL NEGATIVE: 1
EYES NEGATIVE: 1

## 2025-06-14 ASSESSMENT — PATIENT HEALTH QUESTIONNAIRE - PHQ9
SUM OF ALL RESPONSES TO PHQ QUESTIONS 1-9: 0
1. LITTLE INTEREST OR PLEASURE IN DOING THINGS: NOT AT ALL
2. FEELING DOWN, DEPRESSED OR HOPELESS: NOT AT ALL

## 2025-06-14 NOTE — PROGRESS NOTES
Minutes of Exercise per Session: 90 min   Housing Stability: Low Risk  (6/14/2025)    Housing Stability Vital Sign     Unable to Pay for Housing in the Last Year: No     Number of Times Moved in the Last Year: 0     Homeless in the Last Year: No         PHYSICAL EXAM    (up to 7 for level 4, 8 or more for level 5)       Physical Exam  Constitutional:       General: He is not in acute distress.     Appearance: He is well-developed.   HENT:      Head: Normocephalic and atraumatic.   Eyes:      Conjunctiva/sclera: Conjunctivae normal.      Pupils: Pupils are equal, round, and reactive to light.   Cardiovascular:      Rate and Rhythm: Normal rate and regular rhythm.      Heart sounds: No murmur heard.  Pulmonary:      Effort: No respiratory distress.      Breath sounds: Examination of the right-middle field reveals wheezing and rhonchi. Examination of the left-middle field reveals wheezing. Examination of the right-lower field reveals wheezing. Examination of the left-lower field reveals wheezing. Wheezing and rhonchi present. No rales.   Abdominal:      General: There is no distension.      Palpations: Abdomen is soft.      Tenderness: There is no abdominal tenderness.   Musculoskeletal:         General: Normal range of motion.      Cervical back: Normal range of motion and neck supple.   Skin:     General: Skin is warm and dry.      Findings: No erythema or rash.   Neurological:      Mental Status: He is alert and oriented to person, place, and time.      Cranial Nerves: No cranial nerve deficit.   Psychiatric:         Judgment: Judgment normal.           All other labs were within normal range or not returned as of this dictation.    Vitals:    Vitals:    06/14/25 1137   BP: 124/70   BP Site: Left Upper Arm   Patient Position: Sitting   BP Cuff Size: Medium Adult   Pulse: 98   Temp: 99.4 °F (37.4 °C)   TempSrc: Infrared   SpO2: 98%   Weight: 62.6 kg (138 lb)   Height: 1.702 m (5' 7\")       Patient placed on

## 2025-08-25 DIAGNOSIS — F41.1 GAD (GENERALIZED ANXIETY DISORDER): ICD-10-CM

## 2025-08-27 RX ORDER — PAROXETINE 30 MG/1
30 TABLET, FILM COATED ORAL EVERY MORNING
Qty: 90 TABLET | Refills: 1 | Status: SHIPPED | OUTPATIENT
Start: 2025-08-27

## 2025-08-27 RX ORDER — BUPROPION HYDROCHLORIDE 150 MG/1
150 TABLET ORAL EVERY MORNING
Qty: 90 TABLET | Refills: 1 | Status: SHIPPED | OUTPATIENT
Start: 2025-08-27

## 2025-09-03 ENCOUNTER — OFFICE VISIT (OUTPATIENT)
Dept: INTERNAL MEDICINE | Age: 35
End: 2025-09-03

## 2025-09-03 VITALS
HEART RATE: 66 BPM | BODY MASS INDEX: 21.09 KG/M2 | SYSTOLIC BLOOD PRESSURE: 114 MMHG | OXYGEN SATURATION: 95 % | HEIGHT: 69 IN | WEIGHT: 142.4 LBS | TEMPERATURE: 97.1 F | DIASTOLIC BLOOD PRESSURE: 76 MMHG

## 2025-09-03 DIAGNOSIS — F41.1 GAD (GENERALIZED ANXIETY DISORDER): ICD-10-CM

## 2025-09-03 DIAGNOSIS — Z13.1 DIABETES MELLITUS SCREENING: ICD-10-CM

## 2025-09-03 DIAGNOSIS — F17.200 TOBACCO DEPENDENCE: ICD-10-CM

## 2025-09-03 DIAGNOSIS — R19.7 WATERY DIARRHEA: ICD-10-CM

## 2025-09-03 DIAGNOSIS — Z13.220 SCREENING CHOLESTEROL LEVEL: ICD-10-CM

## 2025-09-03 DIAGNOSIS — Z00.00 ENCOUNTER FOR WELL ADULT EXAM WITHOUT ABNORMAL FINDINGS: Primary | ICD-10-CM

## 2025-09-03 PROCEDURE — 99395 PREV VISIT EST AGE 18-39: CPT | Performed by: STUDENT IN AN ORGANIZED HEALTH CARE EDUCATION/TRAINING PROGRAM

## 2025-09-03 RX ORDER — BUPROPION HYDROCHLORIDE 150 MG/1
150 TABLET ORAL 2 TIMES DAILY
Qty: 120 TABLET | Refills: 1 | Status: SHIPPED | OUTPATIENT
Start: 2025-09-03